# Patient Record
Sex: FEMALE | URBAN - METROPOLITAN AREA
[De-identification: names, ages, dates, MRNs, and addresses within clinical notes are randomized per-mention and may not be internally consistent; named-entity substitution may affect disease eponyms.]

---

## 2020-02-05 ENCOUNTER — APPOINTMENT (RX ONLY)
Dept: URBAN - METROPOLITAN AREA CLINIC 75 | Facility: CLINIC | Age: 70
Setting detail: DERMATOLOGY
End: 2020-02-05

## 2020-02-05 DIAGNOSIS — I87.2 VENOUS INSUFFICIENCY (CHRONIC) (PERIPHERAL): ICD-10-CM

## 2020-02-05 DIAGNOSIS — L57.0 ACTINIC KERATOSIS: ICD-10-CM

## 2020-02-05 PROBLEM — D48.5 NEOPLASM OF UNCERTAIN BEHAVIOR OF SKIN: Status: ACTIVE | Noted: 2020-02-05

## 2020-02-05 PROCEDURE — ? COUNSELING

## 2020-02-05 PROCEDURE — 99202 OFFICE O/P NEW SF 15 MIN: CPT | Mod: 25

## 2020-02-05 PROCEDURE — ? LIQUID NITROGEN

## 2020-02-05 PROCEDURE — ? PRESCRIPTION

## 2020-02-05 PROCEDURE — ? RECOMMENDATIONS

## 2020-02-05 PROCEDURE — 17000 DESTRUCT PREMALG LESION: CPT | Mod: 59

## 2020-02-05 PROCEDURE — ? BIOPSY BY SHAVE METHOD

## 2020-02-05 PROCEDURE — ? DIAGNOSIS COMMENT

## 2020-02-05 PROCEDURE — 11102 TANGNTL BX SKIN SINGLE LES: CPT

## 2020-02-05 PROCEDURE — ? REFERRAL

## 2020-02-05 PROCEDURE — ? FULL BODY SKIN EXAM - DECLINED

## 2020-02-05 RX ORDER — FLUOROURACIL 2 G/40G
CREAM TOPICAL BID
Qty: 1 | Refills: 1 | Status: ERX | COMMUNITY
Start: 2020-02-05

## 2020-02-05 RX ADMIN — FLUOROURACIL: 2 CREAM TOPICAL at 00:00

## 2020-02-05 ASSESSMENT — LOCATION DETAILED DESCRIPTION DERM
LOCATION DETAILED: RIGHT DISTAL PRETIBIAL REGION
LOCATION DETAILED: LEFT MEDIAL FOREHEAD

## 2020-02-05 ASSESSMENT — LOCATION ZONE DERM
LOCATION ZONE: FACE
LOCATION ZONE: LEG

## 2020-02-05 ASSESSMENT — LOCATION SIMPLE DESCRIPTION DERM
LOCATION SIMPLE: RIGHT PRETIBIAL REGION
LOCATION SIMPLE: LEFT FOREHEAD

## 2020-02-05 NOTE — PROCEDURE: RECOMMENDATIONS
Recommendation Preamble: The following recommendations were made during the visit:
Detail Level: Zone
Recommendations (Free Text): Sarna lotion

## 2020-08-05 ENCOUNTER — APPOINTMENT (RX ONLY)
Dept: URBAN - METROPOLITAN AREA CLINIC 75 | Facility: CLINIC | Age: 70
Setting detail: DERMATOLOGY
End: 2020-08-05

## 2020-08-05 DIAGNOSIS — R52 PAIN, UNSPECIFIED: ICD-10-CM

## 2020-08-05 DIAGNOSIS — L57.0 ACTINIC KERATOSIS: ICD-10-CM

## 2020-08-05 DIAGNOSIS — I87.2 VENOUS INSUFFICIENCY (CHRONIC) (PERIPHERAL): ICD-10-CM

## 2020-08-05 PROCEDURE — ? COUNSELING

## 2020-08-05 PROCEDURE — ? FULL BODY SKIN EXAM - DECLINED

## 2020-08-05 PROCEDURE — ? ADDITIONAL NOTES

## 2020-08-05 PROCEDURE — 99213 OFFICE O/P EST LOW 20 MIN: CPT

## 2020-08-05 ASSESSMENT — LOCATION ZONE DERM
LOCATION ZONE: LEG
LOCATION ZONE: LEG

## 2020-08-05 ASSESSMENT — LOCATION SIMPLE DESCRIPTION DERM
LOCATION SIMPLE: RIGHT PRETIBIAL REGION
LOCATION SIMPLE: RIGHT PRETIBIAL REGION

## 2020-08-05 ASSESSMENT — LOCATION DETAILED DESCRIPTION DERM
LOCATION DETAILED: RIGHT DISTAL PRETIBIAL REGION
LOCATION DETAILED: RIGHT DISTAL PRETIBIAL REGION

## 2020-08-05 NOTE — PROCEDURE: ADDITIONAL NOTES
Detail Level: Simple
Additional Notes: Patient consent was obtained to proceed with the visit and recommended plan of care after discussion of all risks and benefits, including the risks of COVID-19 exposure.
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 Advised patient to apply Efudex to right cheek x 4 weeks.
Additional Notes: Pt reports intermittent lightening like pain on lower leg, very isolated to inner calf. She is not particularly bothered by it at this point. I recommended she discuss with her primary if it seems to get worse to rule out neuropathy

## 2021-01-13 ENCOUNTER — APPOINTMENT (RX ONLY)
Dept: URBAN - METROPOLITAN AREA CLINIC 75 | Facility: CLINIC | Age: 71
Setting detail: DERMATOLOGY
End: 2021-01-13

## 2021-01-13 DIAGNOSIS — L30.9 DERMATITIS, UNSPECIFIED: ICD-10-CM

## 2021-01-13 DIAGNOSIS — L21.8 OTHER SEBORRHEIC DERMATITIS: ICD-10-CM

## 2021-01-13 PROBLEM — D48.5 NEOPLASM OF UNCERTAIN BEHAVIOR OF SKIN: Status: ACTIVE | Noted: 2021-01-13

## 2021-01-13 PROCEDURE — 99213 OFFICE O/P EST LOW 20 MIN: CPT | Mod: 25

## 2021-01-13 PROCEDURE — ? BIOPSY BY SHAVE METHOD

## 2021-01-13 PROCEDURE — ? COUNSELING

## 2021-01-13 PROCEDURE — ? PRESCRIPTION

## 2021-01-13 PROCEDURE — ? ADDITIONAL NOTES

## 2021-01-13 PROCEDURE — 11102 TANGNTL BX SKIN SINGLE LES: CPT

## 2021-01-13 RX ORDER — TRIAMCINOLONE ACETONIDE 1 MG/G
OINTMENT TOPICAL BID X 2 WEEKS
Qty: 1 | Refills: 1 | Status: ERX | COMMUNITY
Start: 2021-01-13

## 2021-01-13 RX ORDER — KETOCONAZOLE 20 MG/ML
SHAMPOO, SUSPENSION TOPICAL DAILY
Qty: 1 | Refills: 11 | Status: ERX | COMMUNITY
Start: 2021-01-13

## 2021-01-13 RX ADMIN — KETOCONAZOLE: 20 SHAMPOO, SUSPENSION TOPICAL at 00:00

## 2021-01-13 RX ADMIN — TRIAMCINOLONE ACETONIDE: 1 OINTMENT TOPICAL at 00:00
